# Patient Record
(demographics unavailable — no encounter records)

---

## 2025-04-25 NOTE — ASSESSMENT
[FreeTextEntry1] : A/p- Idiopathic partial epilepsy-(TLE) well controlled except for auras up to one/m slightly better on higher dose LTG. -Continue  bid - needs new levels - RTC 1 yr.

## 2025-04-25 NOTE — HISTORY OF PRESENT ILLNESS
[FreeTextEntry1] : f/u partial seizures  August 23 had a CABG. Doing well now. Still has auras about one every 4 weeks sometimes with sense of slight sense of euphoria (less than 10 secs).  No LOC. No witnessed loss of awareness. A bit better but still with some symptoms  Last CPS 9/16. Occurred at a [facility]. 2015 nocturnal GTC.  C/o anxiety/claustrophobia Also developed tinnitus-  had MRI IAC- nl  Past MRIs brain - ubos only  meds - Lamotrigine 200-200 atorvastatin 40/d ramipril 5mg/d baby ASA plavix  no adverse effects

## 2025-04-25 NOTE — PHYSICAL EXAM
[FreeTextEntry1] : alert and oriented x3 attn conc lang nl Cn intact in detail Motor 5/5 DTRs- 2+ except left knee 3 to 3+ CSG wnl